# Patient Record
(demographics unavailable — no encounter records)

---

## 2025-01-10 NOTE — REVIEW OF SYSTEMS
[Chest Discomfort] : chest discomfort [Joint Pain] : joint pain [Joint Stiffness] : joint stiffness [Rash] : rash [Negative] : Heme/Lymph

## 2025-01-10 NOTE — REASON FOR VISIT
[Structural Heart and Valve Disease] : structural heart and valve disease [Symptom and Test Evaluation] : symptom and test evaluation

## 2025-01-16 NOTE — HISTORY OF PRESENT ILLNESS
[FreeTextEntry1] : TARA FLORES  is a 70 year old  F There is a longstanding history of hypertension. At one point she had lost weight and subsequently had an episode of syncope related to her diuretic. There is a history of hyperlipidemia. She previously had an intolerance to statin.  awoke on Monday 4/29/24 awoke with extreme L arm pain and jaw tightness, called EMS and in ambulance developed chest pain. She ruled in for MI, elevated trops and EKG changes. Went for LHC showing mild to mod nonobx CAD. Echo showed EF 50% and RWMAs. There is a concern for SCAD of RCA. Plan was outpatient followup for eval of FMD and med rx.  Return to the hospital 5/3/24 with chest pain. Admitted for monitoring. Persistent elevation of troponin. Evolution of EKG changes. Repeat echo with stable left ventricular function. Addition of colchicine. Inpatient blood work with hemoglobin 13 creatinine 0.6 discharge medic signs include amlodipine 5 mg, aspirin 81, atorvastatin 40 mg, clopidogrel 75 mg, colchicine, lisinopril 5 mg, Toprol 50 mg.  Seen in followup 5/7/24 lisinopril stopped and reduced amlodipine d/t low BPs. Seen again 5/9/24 there was a concern for side effects to colchicine so it was stopped Then returned to [facility] 5/12/24 with recurrent CP radiating to her jaw. Troponin 32. . Echo showed pericardial thickening. 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 on colchicine TID and ibuprofen TID. She feels an improvement in symptoms. She has concerns about the plaque in her heart. She has muscle cramps on atorva 40mg. Previously intolerant rosuvastatin. There is excessive bruising on DAPT to BLUE and BLLE.  Last seen June 2024  in the interim she has followed with Dr. AMRROQUIN referred to rheumatology also inflammatory markers  she was intolerant of colchicine  she also did not take the recommended monoclonal antibody  cardiac MRI June 2024 there was report of interval progression and subacute myocarditis and LV function  blood work June 2024  ESR normal lipid profile with total cholesterol 205  C-reactive protein normal potassium 4.3 creatinine 0.7  she was sent by Dr. MARROQUIN to [place] for consideration of heart biopsy and possible immunotherapy  she was admitted June 29 to July 2 third 2024 underwent RV biopsy there was not recommendation of steroids or further immunosuppression at this time.  The biopsy reportedly showed no inflammation or infiltrative process  she was started on guideline directed medical therapy. Metoprolol and losartan.  She is also on Zetia for nonstatin lipid-lowering therapy  there is also discussion of a cardiac PET scan  last echocardiogram has normal left ventricular function  she was last seen by heart failure specialist in July  she presently is taking metoprolol and Zetia she is not taking the previously recommended losartan Repatha   outside EKG has sinus bradycardia with nonspecific ST changes  recently she was seen in the hospital for chest pain and dyspnea  inpatient blood work hemoglobin 13.1 troponin negative CRP normal  TSH 1.9 lactate 0.8  at that time she was discharged from the emergency room  She reports having an episode of chest discomfort. Fullness in her heart.  She continues to have symptoms of joint pain and dry eyes and reported worsening osteoporosis.  She has been seeing a dermatologist regarding rash.  She is scheduled to see rheumatology.   Today's EKG demonstrates sinus rhythm minor nonspecific ST changes. No significant change from prior.   CMR 5/20/24 suspect of subacute myocarditis. Preserved LV/RV systolic function and structure. Abnormal parametric values of LGE corresponding to [place] criteria. Monitor 3/2024 normal sinus rhythm avg 87 low burden ectopy zero triggers August 2023 hemoglobin 14.6 total cholesterol 219  creatinine 0.7 Lab 3/2024 , , k 4.2, creat 0.67, AST 57, ALT 52, CRP wnl, hgb 13.9, ESR wnl, a1c 5.4, Lp(a) wnl, JOVANI negative, abnl RBC morphology Nuclear 3/2024 5 min exercise, small mild defect mid anterior and inferolateral walls fixed s/o attenuation Carotid doppler 3/2024 mild nonobx atherosclerosis

## 2025-01-16 NOTE — ASSESSMENT
[FreeTextEntry1] :  Blood work requested. Echocardiogram. Stress test. Unable to walk on treadmill. Walking Lexiscan study.  A copy of her MRI report has been provided to the patient to bring to her rheumatologist Follow-up with heart failure, rheumatology, dermatology as scheduled.   myocarditis f/u with HF specialist.  CAD mod nonobx mi ? scad abnl ekg lipid lowering intol statins x2 (LFTs, myalgias)  HTN off norvasc for low BP cont toprol 25mg QHS low salt diet reg exercise and weight loss reviewed  Reviewed red flag symptoms which would warrant emergent medical evaluation. Any questions and concerns were addressed and resolved.

## 2025-01-16 NOTE — HISTORY OF PRESENT ILLNESS
[FreeTextEntry1] : TARA FLORES  is a 70 year old  F There is a longstanding history of hypertension. At one point she had lost weight and subsequently had an episode of syncope related to her diuretic. There is a history of hyperlipidemia. She previously had an intolerance to statin.  awoke on Monday 4/29/24 awoke with extreme L arm pain and jaw tightness, called EMS and in ambulance developed chest pain. She ruled in for MI, elevated trops and EKG changes. Went for LHC showing mild to mod nonobx CAD. Echo showed EF 50% and RWMAs. There is a concern for SCAD of RCA. Plan was outpatient followup for eval of FMD and med rx.  Return to the hospital 5/3/24 with chest pain. Admitted for monitoring. Persistent elevation of troponin. Evolution of EKG changes. Repeat echo with stable left ventricular function. Addition of colchicine. Inpatient blood work with hemoglobin 13 creatinine 0.6 discharge medic signs include amlodipine 5 mg, aspirin 81, atorvastatin 40 mg, clopidogrel 75 mg, colchicine, lisinopril 5 mg, Toprol 50 mg.  Seen in followup 5/7/24 lisinopril stopped and reduced amlodipine d/t low BPs. Seen again 5/9/24 there was a concern for side effects to colchicine so it was stopped Then [place] [facility] [place]12[place]24 with recurrent CP radiating to her jaw. Troponin 32. . Echo showed pericardial thickening. 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 on colchicine TID and ibuprofen TID. She feels an improvement in symptoms. She has concerns about the plaque in her heart. She has muscle cramps on atorva 40mg. Previously intolerant rosuvastatin. There is excessive bruising on DAPT to BLUE and BLLE.  Last seen June 2024  in the interim she has followed with Dr. MARROQUIN referred to rheumatology also inflammatory markers  she was intolerant of colchicine  she also did not take the recommended monoclonal antibody  cardiac MRI June 2024 there was report of interval progression and subacute myocarditis and LV function  blood work June 2024  ESR normal lipid profile with total cholesterol 205  C-reactive protein normal potassium 4.3 creatinine 0.7  she was sent by Dr. MARROQUIN to [place] for consideration of heart biopsy and possible immunotherapy  she was admitted June 29 to July 2 third 2024 underwent RV biopsy there was not recommendation of steroids or further immunosuppression at this time.  The biopsy reportedly showed no inflammation or infiltrative process  she was started on guideline directed medical therapy. Metoprolol and losartan.  She is also on Zetia for nonstatin lipid-lowering therapy  there is also discussion of a cardiac PET scan  last echocardiogram has normal left ventricular function  she was last seen by heart failure specialist in July  she presently is taking metoprolol and Zetia she is not taking the previously recommended losartan Repatha   outside EKG has sinus bradycardia with nonspecific ST changes  recently she was seen in the hospital for chest pain and dyspnea  inpatient blood work hemoglobin 13.1 troponin negative CRP normal  TSH 1.9 lactate 0.8  at that time she was discharged from the emergency room  She reports having an episode of chest discomfort. Fullness in her heart.  She continues to have symptoms of joint pain and dry eyes and reported worsening osteoporosis.  She has been seeing a dermatologist regarding rash.  She is scheduled to see rheumatology.   Today's EKG demonstrates sinus rhythm minor nonspecific ST changes. No significant change from prior.   CMR 5/20/24 suspect of subacute myocarditis. Preserved LV/RV systolic function and structure. Abnormal parametric values of LGE corresponding to [place] criteria. Monitor 3/2024 normal sinus rhythm avg 87 low burden ectopy zero triggers August 2023 hemoglobin 14.6 total cholesterol 219  creatinine 0.7 Lab 3/2024 , , k 4.2, creat 0.67, AST 57, ALT 52, CRP wnl, hgb 13.9, ESR wnl, a1c 5.4, Lp(a) wnl, JOVANI negative, abnl RBC morphology Nuclear 3/2024 5 min exercise, small mild defect mid anterior and inferolateral walls fixed s/o attenuation Carotid doppler 3/2024 mild nonobx atherosclerosis

## 2025-01-16 NOTE — HISTORY OF PRESENT ILLNESS
[FreeTextEntry1] : TARA FLORES  is a 70 year old  F There is a longstanding history of hypertension. At one point she had lost weight and subsequently had an episode of syncope related to her diuretic. There is a history of hyperlipidemia. She previously had an intolerance to statin.  awoke on Monday 4/29/24 awoke with extreme L arm pain and jaw tightness, called EMS and in ambulance developed chest pain. She ruled in for MI, elevated trops and EKG changes. Went for LHC showing mild to mod nonobx CAD. Echo showed EF 50% and RWMAs. There is a concern for SCAD of RCA. Plan was outpatient followup for eval of FMD and med rx.  Return to the hospital 5/3/24 with chest pain. Admitted for monitoring. Persistent elevation of troponin. Evolution of EKG changes. Repeat echo with stable left ventricular function. Addition of colchicine. Inpatient blood work with hemoglobin 13 creatinine 0.6 discharge medic signs include amlodipine 5 mg, aspirin 81, atorvastatin 40 mg, clopidogrel 75 mg, colchicine, lisinopril 5 mg, Toprol 50 mg.  Seen in followup 5/7/24 lisinopril stopped and reduced amlodipine d/t low BPs. Seen again 5/9/24 there was a concern for side effects to colchicine so it was stopped Then [place] [facility] [place]24 with recurrent CP radiating to her jaw. Troponin 32. . Echo showed pericardial thickening. 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 on colchicine TID and ibuprofen TID. She feels an improvement in symptoms. She has concerns about the plaque in her heart. She has muscle cramps on atorva 40mg. Previously intolerant rosuvastatin. There is excessive bruising on DAPT to BLUE and BLLE.  Last seen June 2024  in the interim she has followed with Dr. MARROQUIN referred to rheumatology also inflammatory markers  she was intolerant of colchicine  she also did not take the recommended monoclonal antibody  cardiac MRI June 2024 there was report of interval progression and subacute myocarditis and LV function  blood work June 2024  ESR normal lipid profile with total cholesterol 205  C-reactive protein normal potassium 4.3 creatinine 0.7  she was sent by Dr. MARROQUIN to [place] for consideration of heart biopsy and possible immunotherapy  she was admitted June 29 to July 2 third 2024 underwent RV biopsy there was not recommendation of steroids or further immunosuppression at this time.  The biopsy reportedly showed no inflammation or infiltrative process  she was started on guideline directed medical therapy. Metoprolol and losartan.  She is also on Zetia for nonstatin lipid-lowering therapy  there is also discussion of a cardiac PET scan  last echocardiogram has normal left ventricular function  she was last seen by heart failure specialist in July  she presently is taking metoprolol and Zetia she is not taking the previously recommended losartan Repatha   outside EKG has sinus bradycardia with nonspecific ST changes  recently she was seen in the hospital for chest pain and dyspnea  inpatient blood work hemoglobin 13.1 troponin negative CRP normal  TSH 1.9 lactate 0.8  at that time she was discharged from the emergency room  She reports having an episode of chest discomfort. Fullness in her heart.  She continues to have symptoms of joint pain and dry eyes and reported worsening osteoporosis.  She has been seeing a dermatologist regarding rash.  She is scheduled to see rheumatology.   Today's EKG demonstrates sinus rhythm minor nonspecific ST changes. No significant change from prior.   CMR 5/20/24 suspect of subacute myocarditis. Preserved LV/RV systolic function and structure. Abnormal parametric values of LGE corresponding to [place] criteria. Monitor 3/2024 normal sinus rhythm avg 87 low burden ectopy zero triggers August 2023 hemoglobin 14.6 total cholesterol 219  creatinine 0.7 Lab 3/2024 , , k 4.2, creat 0.67, AST 57, ALT 52, CRP wnl, hgb 13.9, ESR wnl, a1c 5.4, Lp(a) wnl, JOVANI negative, abnl RBC morphology Nuclear 3/2024 5 min exercise, small mild defect mid anterior and inferolateral walls fixed s/o attenuation Carotid doppler 3/2024 mild nonobx atherosclerosis

## 2025-01-24 NOTE — ASSESSMENT
[FreeTextEntry1] : TARA FLORES is a 70 year old F who presents today Jan 24, 2025 with the above history and the following active issues:   Abnormal nuclear stress test.  The SPECT perfusion defect is significantly large compared to previous nuclear SPECT; AND in approximately in the same area as LGE on cMRI.  Chest pain, atypical  Above testing has been reviewed with the patient.  Discussed risk of major adverse cardiovascular events without further evaluation and management.  Recommend cardiac catheterization.  It may also be worthwhile pursuing another cMRI if cath cannot explain significant worsening of SPECT scan. Discussed the risks, benefits, alternatives of invasive angiography. Discussed potential for revascularization, percutaneous v surgical All questions answered. If escalating sx or sx at rest then 911 Daily aspirin therapy.  Beta blocker therapy.   A copy of her MRI report has been provided to the patient to bring to her rheumatologist Follow-up with heart failure, rheumatology, dermatology as scheduled.   myocarditis f/u with HF specialist.  CAD mod nonobx mi ? scad abnl ekg lipid lowering intol statins x2 (LFTs, myalgias) declines injectable lipid lowering cont zetia, add nexletol  denies gout hx  HTN off norvasc for low BP cont toprol 25mg QHS low salt diet reg exercise and weight loss reviewed  Sincerely,   JASPREET Chávez Patients history, testing, medications, and any relative changes to plan of care reviewed with supervising MD: Dr. Paul Blair

## 2025-01-24 NOTE — HISTORY OF PRESENT ILLNESS
[FreeTextEntry1] : TARA FLORES  is a 70 year old  F There is a longstanding history of hypertension. At one point she had lost weight and subsequently had an episode of syncope related to her diuretic. There is a history of hyperlipidemia. She previously had an intolerance to statin.  awoke on Monday 4/29/24 awoke with extreme L arm pain and jaw tightness, called EMS and in ambulance developed chest pain. She ruled in for MI, elevated trops and EKG changes. Went for LHC showing mild to mod nonobx CAD. Echo showed EF 50% and RWMAs. There is a concern for SCAD of RCA. Plan was outpatient followup for eval of FMD and med rx.  Return to the hospital 5/3/24 with chest pain. Admitted for monitoring. Persistent elevation of troponin. Evolution of EKG changes. Repeat echo with stable left ventricular function. Addition of colchicine. Inpatient blood work with hemoglobin 13 creatinine 0.6 discharge medic signs include amlodipine 5 mg, aspirin 81, atorvastatin 40 mg, clopidogrel 75 mg, colchicine, lisinopril 5 mg, Toprol 50 mg.  Seen in followup 5/7/24 lisinopril stopped and reduced amlodipine d/t low BPs. Seen again 5/9/24 there was a concern for side effects to colchicine so it was stopped Then [place] [facility] [place] with recurrent CP radiating to her jaw. Troponin 32. . Echo showed pericardial thickening. 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 on colchicine TID and ibuprofen TID. She feels an improvement in symptoms. She has concerns about the plaque in her heart. She has muscle cramps on atorva 40mg. Previously intolerant rosuvastatin. There is excessive bruising on DAPT to BLUE and BLLE.  Last seen two week ago Dr. Her Followed with Dr. MARROQUIN referred to rheumatology also inflammatory markers  she was intolerant of colchicine  she also did not take the recommended monoclonal antibody  cardiac MRI June 2024 there was report of interval progression and subacute myocarditis and LV function  blood work June 2024  ESR normal lipid profile with total cholesterol 205  C-reactive protein normal potassium 4.3 creatinine 0.7  she was sent by Dr. MARROQUIN to [place] for consideration of heart biopsy and possible immunotherapy  she was admitted June 29 to July 2 third 2024 underwent RV biopsy there was not recommendation of steroids or further immunosuppression at this time.  The biopsy reportedly showed no inflammation or infiltrative process  she was started on guideline directed medical therapy. Metoprolol and losartan.  She is also on Zetia for nonstatin lipid-lowering therapy  there is also discussion of a cardiac PET scan  last echocardiogram has normal left ventricular function  she was last seen by heart failure specialist in July  she presently is taking metoprolol and Zetia she is not taking the previously recommended losartan Repatha   outside EKG has sinus bradycardia with nonspecific ST changes  recently she was seen in the hospital for chest pain and dyspnea  inpatient blood work hemoglobin 13.1 troponin negative CRP normal  TSH 1.9 lactate 0.8  at that time she was discharged from the emergency room  She reports having an episode of chest discomfort. Fullness in her heart.  She continues to have symptoms of joint pain and dry eyes and reported worsening osteoporosis.  She has been seeing a dermatologist regarding rash.  She is scheduled to see rheumatology.   Lab 1/2025 hg 14,  (prior 578), tsh wnl, , k 5, cr 0.65, ck/crp/esr/rose wnl, a1c 5.3, Lp(a) wnl  Echo 1/2025 EF 55-60% mild LVH, GLS -18, small RV cavity increase wall thickness normal function, trace VHD Nuclear stress test 1/2025 exercise 6 min 39 sec, no symptom, no sig EKG changes beyond baseline. Large moderate to severe fixed defect lateral inferolateral and apical anteroseptal walls s/o infarct.  EKG demonstrates sinus rhythm minor nonspecific ST changes. No significant change from prior.   CMR 5/20/24 suspect of subacute myocarditis. Preserved LV/RV systolic function and structure. Abnormal parametric values of LGE corresponding to [place] criteria. Monitor 3/2024 normal sinus rhythm avg 87 low burden ectopy zero triggers August 2023 hemoglobin 14.6 total cholesterol 219  creatinine 0.7 Lab 3/2024 , , k 4.2, creat 0.67, AST 57, ALT 52, CRP wnl, hgb 13.9, ESR wnl, a1c 5.4, Lp(a) wnl, ROSE negative, abnl RBC morphology Nuclear 3/2024 5 min exercise, small mild defect mid anterior and inferolateral walls fixed s/o attenuation Carotid doppler 3/2024 mild nonobx atherosclerosis

## 2025-01-30 NOTE — HISTORY OF PRESENT ILLNESS
[FreeTextEntry1] : 71 yo F pericarditis with subacute myocarditis, nonobstructive CAD, HTN, HLD, presents today for follow-up of her cardiomyopathy.  Patient describes herself as having longstanding hypertension for> 10 years.  She was evaluated the beginning of March by her primary cardiologist for heart palpitations.  She had a Holter monitor, echo and nuclear test performed at that time and was started on low-dose metoprolol.  She had no evidence of any ischemia or pericardial effusion.  The day after her follow-up visit she presented to [facility] on 4/29/2024 with jaw pain found to have STEMI.  Echo with LVEF 50% with some inferior WMA. Patient had elevated troponins and underwent LHC showing mild to moderate nonobstructive CAD.  She was discovered with consideration for possible SCAD/FMD/ myocarditis.  Patient continued to have recurrent chest pain and was readmitted on 5/3/2024 and 5/12/24.  Troponin once again was waxing and waning.  Colchicine was added for possible pericarditis.  She had intermittent intolerance to colchicine.  She had 2 other subsequent ED visits with similar chest pain radiating to her jaw.  ESR at this time was 6.  Outpatient MRI was performed on 5/20/24 which showed LVEF 53% with base mid subepicardial enhancement in the lateral and inferolateral walls -suspicious for subacute myocarditis.    First met her on 6/21/2024.  At that time she colchicine earlier due to feeling poorly.  She has been on some form of colchicine since 5/3/2024 - 6/14/24 (~ 1 month).  She ongoing chest discomfort.  I had offer her a myocardial biopsy as a diagnostic tool however patient deferred.  Instead, we proceeded with repeat cardiac MRI which was done on 6/27/2024 which reported interval progression of subacute myocarditis with decrease in LV function from 53 to 47%.  She was admitted to [facility] for cardiac biopsy and expedited evaluation.  Luminary biopsy results show no inflammation or infiltrative process.  No vasculitis or gamulomas. There was an increase lipofuscin pigmented myocardial cells are present. Laboratory markers were low with ESR < 2 and CRP <3.  No steroids or immunomodulators were initiated.  She has not seen me since July 2024.    Since then she presented to her primary cardiologist in January 2025 with chest discomfort. Nuclear SPECT on 1/16/2024 was done and showed a large sized moderate to severe fixed defect in the lateral inferolateral and apical septal walls.  Echocardiogram on 1/10/2025 showed normal LVEF of 55-60% with no effusion.  Patient presented to [facility] with chest pain and was admitted from 1/25-1/27/25.  Troponin fluctuated between 8-10.  CK 76, CRP <0.4, ESR 4.   EKG without ischemic changes.  She underwent left heart catheterization which showed only 20% LAD stenosis.  She notes her metoprolol was increased to 50 mg twice daily.  She self took discontinued prior losartan in July.  Here for posthospital follow-up.  She reports ongoing chest pain and joint pain.  She is following with rheumatologist in [place]. She denies dyspnea at rest or exertion, orthopnea, leg swelling, changes in appetite, changes in weight, bendopnea, lightheadedness, dizziness, and syncope/pre-syncope.  She does endorse dry eye and occasional dysphagia with GERD and hiatal hernia. She does not have a diagnosis of arthritis or any autoimmune conditions that her rheumatologist was able to diagnose.  However, she was on prednisone before [place].  She now has osteoporosis.

## 2025-01-30 NOTE — DISCUSSION/SUMMARY
[FreeTextEntry1] : # Chest Pain with prior myocarditis and non-obstructive CAD  - ETIOLOGY:  - unclear.  - no pericardial effusion on echo.  EKG reviewed from 1/10/25 without and ST changes to suggest pericarditis. -LHC 1/27/25: 20% LAD stenosis. - Prior endomyocardial heart biopsy (7/2024) without active inflammation, vasculitis or granulomas.  - Undergoing rheumatology workup:  Work up so far negative: ESR, CRP, JOVANI, dsDNA, C3, C4, JARRELL, SSA/SSB, vitamin D 1,25, ACE, MPO, PR3.  Mildly positive RF to 19, CCP pending.  Positive Coxsackie antibodies. - PET (non-cardiac) 7/15/24: FDG uptake seen on lateral wall of heart only. This did not match the entire areas of LGE uptake on MRI. -With recent worsening symptoms, will obtain repeat cMRI.  -Will also obtain rest stress cardiac PET to rule out microvascular ischemia.  We did discuss possible LHC with ANOCA testing if concerns still ongoing.  - TREATMENT:  -No plan for current empiric treatment with accelerated steroid/anti-inflammatory therapy.  Troponin, ESR and CRP negative at this time. -Patient intolerant of colchicine.  She was only able to complete 1.5 months of therapy. -Of note she now has osteoporosis and will need to consider nonsteroidal therapies in the future if needed.  # HF rec EF  - previously was low at ~47%.  Last 55-60%.   - Continue on metoprolol 50 mg XL daily. - RESUME [id number] daily in PM. This will also help with chest pain if she has microvascular ischemia. - Euvolemic on exam.  Does not need any diuretics at this time.  #Hyperlipidemia -Patient has intolerance to statins (muscle cramps with atorvastatin 40 mg daily).  She was offered Repatha however she notes that this is too expensive. -She is on Zetia but notes that she still has muscle pain.  She wants to try bempedoic acid.  Prescription given. -Lipid panel 1/26/2025: , HDL 53, LDL 83, TG 59.  Follow-up after cMRI and PET scan.

## 2025-01-30 NOTE — PHYSICAL EXAM
[Normal] : soft, non-tender, no masses/organomegaly, normal bowel sounds [Normal Gait] : normal gait [No Edema] : no edema [No Rash] : no rash [Moves all extremities] : moves all extremities [Alert and Oriented] : alert and oriented [de-identified] : no pain on palpation over chest [de-identified] : warm

## 2025-01-30 NOTE — CARDIOLOGY SUMMARY
[de-identified] : Nuclear SPECT 1/16/2024: 6: 39 min, 8 METS, MPHR 88%, no chest pain or symptoms during exercise.  Had a large sized moderate to severe fixed defect in the lateral inferolateral and apical septal walls. 4/29/2024: LVEF 50%, LVEDD 5 cm, IVSd 0.8 cm, hypokinetic inferolateral walls, normal RV function, mild LAE, mild to mod MR, no pericardial effusion. [de-identified] : 1/14/2025: LVEF 55 to 60%, LVEDD 4.4 cm, IVSd 1.2 cm, GLS -18%, normal RV TAPSE 2.7 cm, trace MR, trace TR, PASP 9 mmHg, normal IVC. [de-identified] : [facility] 6/27/24: LVEF 47%, LVEDV 146mL, RVEF 62%, RVEDV 84 mL, increase in subepicardial, mid wall enhancement of the basal anterolateral, lateral and apical lateral segments. [facility] 5/20/24: LVEF 53%, LVEDD 123 mL, RVEF 67%, RV EDV 93 mL, base mid subepicardial enhancement in the lateral and inferolateral walls -suspect subacute myocarditis. [de-identified] : PET scan 7/15/24 (not a cardiac dedicated PET scan):  LV wall focal uptake with SUV 3.3. [de-identified] : [facility] 1/27/25: Nonobstructive CAD, LAD 20%. [facility] 4/29/2024: Nonobstructive CAD.  BLESSING-3 flow all vessels.  LVEDP 23. [de-identified] : Endomyocardial biopsy 7/2/2024:  Myocardium with increased lipofuscin pigment.  No inflammation, granulomas or vasculitis is present.

## 2025-01-30 NOTE — ASSESSMENT
[FreeTextEntry1] : 69 yo F pericarditis with subacute myocarditis, nonobstructive CAD, HTN, HLD, presents today for follow-up of her cardiomyopathy.

## 2025-02-03 NOTE — REASON FOR VISIT
[Symptom and Test Evaluation] : symptom and test evaluation [FreeTextEntry1] : I saw this  70 year old  F in. f/u on 02/03/25   There is a longstanding history of hypertension. At one point she had lost weight and subsequently had an episode of syncope related to her diuretic. There is a history of hyperlipidemia. She previously had an intolerance to statin.  Initial visit with heart racing. She reports her heart rate is elevated and she feels awareness of her chest. She has trouble catching her breath. There is associated lightheadedness. There are chronic musculoskeletal pains. She had a monitor and developed a rash. Walks. Eating healthy meals direct.  awoke on Monday 4/29/24 awoke with extreme L arm pain and jaw tightness, called EMS and in ambulance developed chest pain. She ruled in for MI, elevated trops and EKG changes. Went for [facility] showing mild to mod nonobx CAD. Echo showed EF 50% and RWMAs. There is a concern for SCAD of RCA. Plan was outpatient followup for eval of FMD and med rx.  [place] [facility] [place] elevated troponin, residual and evolving EKG  observed had echo with low nl ef and inferior wma  she is hoping to travel to travel to [facility] over the summer  Her home blood pressure has been low. I have stopped her lisinopril and reduced her dose of amlodipine. She prefers not to attend [facility] for cardiac rehab due to prior traumatic experience.  Monitor 3/2024 normal sinus rhythm avg 87 low burden ectopy zero triggers August 2023 hemoglobin 14.6 total cholesterol 219  creatinine 0.7 Lab 3/2024 , , k 4.2, creat 0.67, AST 57, ALT 52, CRP wnl, hgb 13.9, ESR wnl, a1c 5.4, Lp(a) wnl, JOVANI negative, abnl RBC morphology Nuclear 3/2024 5 min exercise, small mild defect mid anterior and inferolateral walls fixed s/o attenuation Carotid doppler 3/2024 mild nonobx atherosclerosis Echo 3/2024 EF 60% trace VHD She underwent a cardiac MRI which revealed a diagnosis of myocarditis localized to the inf. lateral wall. Last week she presented to the emergency room again with chest pain and underwent cardiac cath which again revealed normal coronary arteries.  The right radial cath site has mild ecchymosis and is tender but is not worrisome.

## 2025-02-03 NOTE — REVIEW OF SYSTEMS
[Dyspnea on exertion] : dyspnea during exertion [Joint Pain] : joint pain [Under Stress] : under stress [Negative] : Heme/Lymph

## 2025-02-03 NOTE — ASSESSMENT
[FreeTextEntry1] :   Recent hospital records have been reviewed.  Plan for a symptom limited submaximal exercise test then enrollment in cardiac rehab  medication list has been reconciled.  Monitor heart rate and blood pressure at cardiac rehab.  Close clinical follow-up.  We also discussed cardiac MRI in the future to reevaluate left ventricular function and scar burden  recent mi ? scad abnl ekg HTN now BP low low salt diet reg exercise and weight loss reviewed  HLD prior statin intolerance, now back on atorva mild carotid plaque diet changes reviewed  Rash. inflammatory markers negative on labs followup derm/PCP CTA c/a/p and coordinate with vascular re eval for FMD  Saw GI for abd pain reports less ETOH and LFTs improved, has upcoming endoscopy. Seen by vascular Plan for LLE phlebectomy and BLE sclerotherapy.

## 2025-02-03 NOTE — HISTORY OF PRESENT ILLNESS
[FreeTextEntry1] : She has recurrent  chest pain She has no shortness of breath She has no palpitations She has no syncope She is neurologically intact She has no edema She has no skin rashes

## 2025-02-03 NOTE — ADDENDUM
Chief complaint:   Chief Complaint   Patient presents with   • URI     Triaged       Vitals:  Visit Vitals  /82   Pulse 89   Temp 97.7 °F (36.5 °C) (Oral)   Resp 19   Ht 5' 3\" (1.6 m)   Wt 72.6 kg (160 lb)   LMP 04/15/2022 (Approximate)   SpO2 95%   BMI 28.34 kg/m²       HISTORY OF PRESENT ILLNESS     The patient is a 27-year-old female who presents to the urgent care with complaint of a 3 day history of nasal congestion, rhinorrhea, postnasal drip, a nonproductive cough, intermittent nausea, intermittent wheezing, chest tightness with coughing, shortness of breath with coughing/activity, itchy watery eyes, and fatigue.  The patient reports a history of seasonal allergies.  She reports taking an antihistamine and Flonase with some improvement.  She denies having any fevers, chills, chest pain, vomiting, diarrhea, or loss of taste or smell.  She denies any known COVID-19 exposures or history of having COVID-19 in the last 90 days.  She is vaccinated against COVID-19.  She denies a history of asthma, pneumonia, or cigarette smoking.  She denies any chance of pregnancy and is not breastfeeding.  She denies recent travel. She is also complaining of a faint itching rash on the inner aspects of her bilateral elbows that has been present for a few weeks. She denies noticing any drainage from the area. She reports a history of eczema. She denies using any new products or ingesting new foods/medications.       Other significant problems:  Patient Active Problem List    Diagnosis Date Noted   • Flexural eczema 05/15/2017     Priority: Low   • GSI (genuine stress incontinence), female 05/13/2015     Priority: Low   • Allergic rhinitis 05/16/2013     Priority: Low   • Implanon insertion-inserted 2- 11/30/2012     Priority: Low   • Headache(784.0) 10/02/2012     Priority: Low       PAST MEDICAL, FAMILY AND SOCIAL HISTORY     Medications:  Current Outpatient Medications   Medication   • etonogestrel  [FreeTextEntry1] : EKG obtained to assist in the diagnosis and management of assisted problem(s).  Return to the hospital with chest pain.  Admitted for monitoring.  Persistent elevation of troponin.  Evolution of EKG changes.  Repeat echo with stable left ventricular function.  Addition of colchicine.  Inpatient blood work with hemoglobin 13 creatinine 0.6 discharge medic signs include amlodipine 5 mg, aspirin 81, atorvastatin 40 mg, clopidogrel 75 mg, colchicine, lisinopril 5 mg, Toprol 50 mg (NEXPLANON/IMPLANON) 68 MG implant   • triamcinolone (ARISTOCORT) 0.1 % cream   • benzonatate (TESSALON PERLES) 100 MG capsule   • albuterol 108 (90 Base) MCG/ACT inhaler   • predniSONE (DELTASONE) 20 MG tablet   • fluconazole (Diflucan) 150 MG tablet   • albuterol 108 (90 Base) MCG/ACT inhaler   • fluticasone (Flonase) 50 MCG/ACT nasal spray     No current facility-administered medications for this visit.     Facility-Administered Medications Ordered in Other Visits   Medication   • fentaNYL (SUBLIMAZE) 2 mcg/mL - ropivacaine (NAROPIN PF) 0.1% premix epidural infusion       Allergies:  ALLERGIES:   Allergen Reactions   • Seasonal        Past Medical  History/Surgeries:  Past Medical History:   Diagnosis Date   • Seasonal allergies        Past Surgical History:   Procedure Laterality Date   • Tonsillectomy and adenoidectomy         Family History:  History reviewed. No pertinent family history.    Social History:  Social History     Tobacco Use   • Smoking status: Never Smoker   • Smokeless tobacco: Never Used   Substance Use Topics   • Alcohol use: Yes     Alcohol/week: 0.0 standard drinks       REVIEW OF SYSTEMS     Review of Systems   Constitutional: Positive for fatigue. Negative for chills and fever.   HENT: Positive for congestion, postnasal drip, rhinorrhea and sneezing. Negative for ear discharge, ear pain, mouth sores, sinus pain, sore throat, trouble swallowing and voice change.    Eyes: Positive for discharge and itching. Negative for redness.   Respiratory: Positive for cough, chest tightness, shortness of breath and wheezing.    Cardiovascular: Negative for chest pain, palpitations and leg swelling.   Gastrointestinal: Negative for abdominal pain, constipation, diarrhea, nausea and vomiting.   Musculoskeletal: Negative for myalgias, neck pain and neck stiffness.   Skin: Negative for rash.   Neurological: Positive for headaches. Negative for dizziness, syncope and light-headedness.       PHYSICAL EXAM      Physical Exam  Vitals reviewed.   Constitutional:       General: She is not in acute distress.     Appearance: She is well-developed. She is not toxic-appearing or diaphoretic.   HENT:      Right Ear: Tympanic membrane, ear canal and external ear normal.      Left Ear: Tympanic membrane, ear canal and external ear normal.      Nose: Rhinorrhea present.      Right Sinus: No maxillary sinus tenderness or frontal sinus tenderness.      Left Sinus: No maxillary sinus tenderness or frontal sinus tenderness.      Mouth/Throat:      Lips: Pink.      Mouth: Mucous membranes are moist.      Pharynx: Oropharynx is clear. Uvula midline. No pharyngeal swelling, oropharyngeal exudate, posterior oropharyngeal erythema or uvula swelling.      Tonsils: No tonsillar exudate or tonsillar abscesses.      Neck: Normal range of motion and neck supple.   Eyes:      General:         Right eye: No foreign body or discharge.         Left eye: No foreign body or discharge.      Conjunctiva/sclera:      Right eye: Right conjunctiva is not injected.      Left eye: Left conjunctiva is not injected.   Cardiovascular:      Rate and Rhythm: Normal rate and regular rhythm.      Pulses: Normal pulses.      Heart sounds: Normal heart sounds, S1 normal and S2 normal. No murmur heard.    No friction rub. No gallop.   Pulmonary:      Effort: Pulmonary effort is normal. No respiratory distress.      Breath sounds: No stridor. Examination of the right-upper field reveals wheezing. Examination of the left-upper field reveals wheezing. Wheezing (expiratory wheezes cleared with coughing) present. No decreased breath sounds, rhonchi or rales.   Lymphadenopathy:      Cervical: No cervical adenopathy.   Skin:     General: Skin is warm and dry.      Capillary Refill: Capillary refill takes less than 2 seconds.      Findings: No rash.   Neurological:      Mental Status: She is alert and oriented to person, place, and time.   Psychiatric:         Speech:  Speech normal.         ASSESSMENT/PLAN     The patient is afebrile, nontoxic-appearing, and her vitals are stable.  She is not in distress and conversing in full sentences.  She has faint expiratory wheezes in the upper lobes bilaterally which clear with coughing. She is conversing in full sentences and not in any respiratory distress.  Her mucous membranes appear moist.  COVID-19 testing obtained.    COVID-19 test is negative.  I updated the patient on her results.  She is resting comfortably on repeat exam.  Her symptoms and exam findings are consistent with a viral upper respiratory infection.  I will prescribe a short course of prednisone for her wheezing, Tessalon Perles for her cough, OTC antihistamine eyedrops as needed, and albuterol inhaler to use as needed every 4 hours for shortness of breath/wheezing/chest tightness.  I recommended she take Claritin or Zyrtec daily, rest, push fluids, use cough drops, and take vitamin-C, vitamin D3, and zinc for immune support. I will also refill her triamcinolone cream for her flexural eczema.  She has no signs of a secondary bacterial or fungal skin infection.  I discussed the typical duration of viral symptoms and return precautions.    Nakita was seen today for uri.    Diagnoses and all orders for this visit:    Suspected COVID-19 virus infection  -     POCT SARS-COV-2 ANTIGEN    Viral URI with cough    Flexural eczema    Other orders  -     triamcinolone (ARISTOCORT) 0.1 % cream; Apply topically 3 times daily as needed (rash).  -     benzonatate (TESSALON PERLES) 100 MG capsule; Take 1 capsule by mouth 3 times daily as needed for Cough.  -     albuterol 108 (90 Base) MCG/ACT inhaler; Inhale 2 puffs into the lungs every 4 hours as needed for Shortness of Breath or Wheezing.  -     predniSONE (DELTASONE) 20 MG tablet; Take 2 tablets by mouth daily for 4 days.      The patient was wearing a mask and the writer wore personal protective equipment in accordance with CDC  guidelines during the encounter.

## 2025-02-03 NOTE — DISCUSSION/SUMMARY
[___ Month(s)] : in [unfilled] month(s) [FreeTextEntry1] : 1) there is mild ecchymosis at the right radial site with tenderness but no concerning issues 2) her EKG at the hospital was unchanged 3) I told her to remain on her medication to begin slow walking routine to try and return to normal activity. We discussed at length the findings of the MRI which suggested subacute myocarditis.

## 2025-02-24 NOTE — HISTORY OF PRESENT ILLNESS
[FreeTextEntry1] : 69 yo F pericarditis with subacute myocarditis, nonobstructive CAD, HTN, HLD, presents today for follow-up of her cardiomyopathy.  Patient describes herself as having longstanding hypertension for> 10 years.  She was evaluated the beginning of March by her primary cardiologist for heart palpitations.  She had a Holter monitor, echo and nuclear test performed at that time and was started on low-dose metoprolol.  She had no evidence of any ischemia or pericardial effusion.  The day after her follow-up visit she presented to [facility] [place]24 with jaw pain found to have STEMI.  Echo with LVEF 50% with some inferior WMA. Patient had elevated troponins and underwent LHC showing mild to moderate nonobstructive CAD.  She was discovered with consideration for possible SCAD/FMD/ myocarditis.  Patient continued to have recurrent chest pain and was readmitted on 5/3/2024 and 5/12/24.  Troponin once again was waxing and waning.  Colchicine was added for possible pericarditis.  She had intermittent intolerance to colchicine.  She had 2 other subsequent ED visits with similar chest pain radiating to her jaw.  ESR at this time was 6.  Outpatient MRI was performed on 5/20/24 which showed LVEF 53% with base mid subepicardial enhancement in the lateral and inferolateral walls -suspicious for subacute myocarditis.    First met her on 6/21/2024.  At that time she colchicine earlier due to feeling poorly.  She has been on some form of colchicine since 5/3/2024 - 6/14/24 (~ 1 month).  She ongoing chest discomfort.  I had offer her a myocardial biopsy as a diagnostic tool however patient deferred.  Instead, we proceeded with repeat cardiac MRI which was done on 6/27/2024 which reported interval progression of subacute myocarditis with decrease in LV function from 53 to 47%.  She was admitted to [facility] for cardiac biopsy and expedited evaluation.  Luminary biopsy results show no inflammation or infiltrative process.  No vasculitis or gamulomas. There was an increase lipofuscin pigmented myocardial cells are present. Laboratory markers were low with ESR < 2 and CRP <3.  No steroids or immunomodulators were initiated.  She has not seen me since July 2024.    Since then she presented to her primary cardiologist in January 2025 with chest discomfort. Nuclear SPECT on 1/16/2024 was done and showed a large sized moderate to severe fixed defect in the lateral inferolateral and apical septal walls.  Echocardiogram on 1/10/2025 showed normal LVEF of 55-60% with no effusion.  Patient presented to [facility] with chest pain and was admitted from 1/25-1/27/25.  Troponin fluctuated between 8-10.  CK 76, CRP <0.4, ESR 4.   EKG without ischemic changes.  She underwent left heart catheterization which showed only 20% LAD stenosis.  She notes her metoprolol was increased to 50 mg twice daily.  She self took discontinued prior losartan in July.  I last saw her on 1/30/2025 and had ordered cardiac PET plus MRI.  However, had a mechanical fall tripping over a tree branch with a number of injuries that precluded her from getting these test.  She is now rescheduled them to 3/7 and 3/11 respectively.  We also talked about needing to maximize her lipid therapy.  She had side effects with Zetia.  I had started her on a trial of Nexletol however, she notes that she looked up the potential side effects of this medication does not want to take it.  She has since done research on fish oil and is now taking 2 tablets daily of her own supplement.  Last visit I also started her back on losartan 12.5 mg daily in PM.  She notes feeling poorly with this (no dizziness) and is going to try to take this with dinner instead of prior to bed.  Since her fall, she has been doing cold plunged therapy and is looking to start swimming classes.  She denies dyspnea at rest or exertion, orthopnea, leg swelling, changes in appetite, changes in weight, bendopnea, lightheadedness, dizziness, and syncope/pre-syncope.  She does endorse dry eye and occasional dysphagia with GERD and hiatal hernia. She does not have a diagnosis of arthritis or any autoimmune conditions that her rheumatologist was able to diagnose.  However, she was on prednisone before [place].  She now has osteoporosis.  She is supposed to start on Prolia but is yet to do so as she is waiting for dental clearance.

## 2025-02-24 NOTE — CARDIOLOGY SUMMARY
[de-identified] : Nuclear SPECT 1/16/2024: 6: 39 min, 8 METS, MPHR 88%, no chest pain or symptoms during exercise.  Had a large sized moderate to severe fixed defect in the lateral inferolateral and apical septal walls. 4/29/2024: LVEF 50%, LVEDD 5 cm, IVSd 0.8 cm, hypokinetic inferolateral walls, normal RV function, mild LAE, mild to mod MR, no pericardial effusion. [de-identified] : 1/14/2025: LVEF 55 to 60%, LVEDD 4.4 cm, IVSd 1.2 cm, GLS -18%, normal RV TAPSE 2.7 cm, trace MR, trace TR, PASP 9 mmHg, normal IVC. [de-identified] : [facility] 6/27/24: LVEF 47%, LVEDV 146mL, RVEF 62%, RVEDV 84 mL, increase in subepicardial, mid wall enhancement of the basal anterolateral, lateral and apical lateral segments. [facility] 5/20/24: LVEF 53%, LVEDD 123 mL, RVEF 67%, RV EDV 93 mL, base mid subepicardial enhancement in the lateral and inferolateral walls -suspect subacute myocarditis. [de-identified] : PET scan 7/15/24 (not a cardiac dedicated PET scan):  LV wall focal uptake with SUV 3.3. [de-identified] : [facility] 1/27/25: Nonobstructive CAD, LAD 20%. [facility] 4/29/2024: Nonobstructive CAD.  BLESSING-3 flow all vessels.  LVEDP 23. [de-identified] : Endomyocardial biopsy 7/2/2024:  Myocardium with increased lipofuscin pigment.  No inflammation, granulomas or vasculitis is present.

## 2025-02-24 NOTE — PHYSICAL EXAM
[Normal] : soft, non-tender, no masses/organomegaly, normal bowel sounds [Normal Gait] : normal gait [No Edema] : no edema [No Rash] : no rash [Moves all extremities] : moves all extremities [Alert and Oriented] : alert and oriented [de-identified] : no pain on palpation over chest [de-identified] : warm

## 2025-02-24 NOTE — ASSESSMENT
[FreeTextEntry1] : 71 yo F pericarditis with subacute myocarditis, nonobstructive CAD, HTN, HLD, presents today for follow-up of her cardiomyopathy.

## 2025-02-24 NOTE — DISCUSSION/SUMMARY
[FreeTextEntry1] : # Chest Pain with prior myocarditis and non-obstructive CAD  - ETIOLOGY:  - unclear.  - no pericardial effusion on echo.  EKG reviewed from 1/10/25 without and ST changes to suggest pericarditis. -LHC 1/27/25: 20% LAD stenosis. - Prior endomyocardial heart biopsy (7/2024) without active inflammation, vasculitis or granulomas.  - Undergoing rheumatology workup:  Work up so far negative: ESR, CRP, JOVANI, dsDNA, C3, C4, JARRELL, SSA/SSB, vitamin D 1,25, ACE, MPO, PR3.  Mildly positive RF to 19, CCP pending.  Positive Coxsackie antibodies. - PET (non-cardiac) 7/15/24: FDG uptake seen on lateral wall of heart only. This did not match the entire areas of LGE uptake on MRI. -With recent worsening symptoms, will obtain repeat cMRI.  -Will also obtain rest stress cardiac PET to rule out microvascular ischemia.  We did discuss possible LHC with ANOCA testing if concerns still ongoing.  - TREATMENT:  -No plan for current empiric treatment with accelerated steroid/anti-inflammatory therapy.  Troponin, ESR and CRP negative at this time. -Patient intolerant of colchicine.  She was only able to complete 1.5 months of therapy. -Of note she now has osteoporosis and will need to consider nonsteroidal therapies in the future if needed.  # HF rec EF  - previously was low at ~47%.  Last 55-60%.   - Continue on metoprolol 50 mg XL daily. - RESUME qgtogebf82.5mg daily.  Tyr to take with dinner as opposed to PM to see if she feels better. This will also help with chest pain if she has microvascular ischemia.  She is agreeable. - Euvolemic on exam.  Does not need any diuretics at this time.  #Hyperlipidemia -Patient has intolerance to statins (muscle cramps with atorvastatin 40 mg daily) and Zetia (msulce pain).  She was previously offered Repatha however she notes that this is too expensive. - She does not want to start bempedoic acid due to potential tendon effects. -She has self-started on fish oil supplements.  WE discussed that this may not lower her LDL.  However, we will proceed with checking her cholesterol checked in 2 months. - May do well with a PSK9i (have not yet discussed) or RNA age - Leqvio. -Lipid panel 1/17/2025: TG 58, , HDL 59, . -6/29/2024: T TG 63, , HDL 60, .  Follow-up after cMRI and PET scan.

## 2025-03-13 NOTE — DISCUSSION/SUMMARY
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  Echo with LVEF 50% with some inferior WMA but no obstructive CAD on cath X2.  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  Now with residual scar in inferolateral territory based on numerous studies. Her recent CP episode in Jan 2025 may also have been from a spasm event. PRIOR STUDIES/TX: - MRI on 5/24/24 was concerning for possible subacute myocarditis (but no pericarditis).   - She completed 1.5 months of colchicine.  But had to stop due to diarrhea/GI intolerance. - Subsequent MRI on 6/27/2024 showed interval progression of myocarditis.  - Endomyocardial heart biopsy (7/2024) without active inflammation, vasculitis or granulomas.  -We had explored potential cardiac sarcoid.  However, patient had a PET scan (7/14/24) that was a full body scan, not a dedicated cardiac PET which showed focal increased uptake in the LV lateral wall. This did not match the entire areas of LGE uptake on MRI. Low suspicion for cardiac sarcoid at this time but can consider dedicated cardiac PET at a future date. - Rheumatology workup:  Work up so far negative: ESR, CRP, JOVANI, dsDNA, C3, C4, JARRELL, SSA/SSB, vitamin D 1,25, ACE, MPO, PR3.  Mildly positive RF to 19.  Positive Coxsackie antibodies. -[facility] 1/27/25: 20% LAD stenosis. - cMRI and rest/stress PET done in Feb/March 2025 show residual scar burden of 16.2% in the inferolateral wall with some hypokinesis.  No ischemia.  MFR > 2 in all territories with suggestive of no evidence of classic microvascular dysfunction.  However, stress blood flow did drop some but not enough to drop MFR.  May benefit from targeted microvascular therapy.  Will start on Imdur as below.  # HF reduced EF  - previously was low at ~48%. - Continue on metoprolol 50 mg XL daily. -Did not tolerate losartan at any dose due to headache. -We extensively discussed GDMT options today.  We will start with Imdur 30 mg XL daily to prevent spasm.  - We then discussed adding on Farxiga/Jardiance as well as spironolactone.  - Patient was given a sample of Jardiance 10 mg daily.  If she tolerates Imdur well, she will start on Jardiance 1 week from now.  Nursing team to follow-up with her next week. - Euvolemic on exam.  Does not need any diuretics at this time.  #Hyperlipidemia -Patient has intolerance to statins (muscle cramps with atorvastatin 40 mg daily) and Zetia (muscle pain).  - She was previously offered Repatha however she notes that this is too expensive. - She does not want to start bempedoic acid due to potential tendon effects. -She has self-started on fish oil supplements.   - May do well with a PSK9i (have not yet discussed) or RNA age - Leqvio. -1/17/2025: TG 58, , HDL 59, . -6/29/2024: T TG 63, , HDL 60, . - 1/17/25: , TG 58, HDL 59, .  Follow-up in 2 weeks.

## 2025-03-13 NOTE — ASSESSMENT
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

## 2025-03-13 NOTE — HISTORY OF PRESENT ILLNESS
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  Patient describes herself as having longstanding hypertension for> 10 years.  She was evaluated the beginning of March by her primary cardiologist for heart palpitations.  This occurred shortly after she had started a new weight loss program called [employer].  She had lost 11 pounds over the course of 3 weeks.  She was exercising for two 45-minute intervals per day.  She established care with Dr. Her.  Holter monitor, echo and nuclear test performed at that time and was started on low-dose metoprolol.  She had no evidence of any ischemia or pericardial effusion.  The day after her follow-up visit she presented to [facility] on 4/29/2024 with jaw pain found to have STEMI.  Echo with LVEF 50% with some inferior WMA. Patient had elevated troponins and underwent LHC showing mild to moderate nonobstructive CAD.  She was discovered with consideration for possible SCAD/FMD/ myocarditis.  Patient continued to have recurrent chest pain and was readmitted on 5/3/2024 and 5/12/24.  Troponin once again was waxing and waning.  Colchicine was added for possible pericarditis.  She had intermittent intolerance to colchicine.  She had 2 other subsequent ED visits with similar chest pain radiating to her jaw.  ESR at this time was 6.  Outpatient MRI was performed on 5/20/24 which showed LVEF 53% with base mid subepicardial enhancement in the lateral and inferolateral walls -suspicious for subacute myocarditis.    First met her on 6/21/2024.  At that time she colchicine earlier due to feeling poorly.  She has been on some form of colchicine since 5/3/2024 - 6/14/24 (~ 1 month).  She ongoing chest discomfort.  I had offer her a myocardial biopsy as a diagnostic tool however patient deferred.  Instead, we proceeded with repeat cardiac MRI which was done on 6/27/2024 which reported interval progression of subacute myocarditis with decrease in LV function from 53 to 47%.  She was admitted to [facility] for cardiac biopsy and expedited evaluation.  Luminary biopsy results show no inflammation or infiltrative process.  No vasculitis or gamulomas. There was an increase lipofuscin pigmented myocardial cells are present. Laboratory markers were low with ESR < 2 and CRP <3.  No steroids or immunomodulators were initiated.  She has not seen me since July 2024.  She represented to her primary cardiologist in January 2025 with chest discomfort. Nuclear SPECT on 1/16/2024 was done and showed a large sized moderate to severe fixed defect in the lateral inferolateral and apical septal walls.  Echocardiogram on 1/10/2025 showed normal LVEF of 55-60% with no effusion.  Patient presented to [facility] with chest pain and was admitted from 1/25-1/27/25.  Troponin fluctuated between 8-10.  CK 76, CRP <0.4, ESR 4.   EKG without ischemic changes.  She underwent left heart catheterization which showed only 20% LAD stenosis.  She notes her metoprolol was increased to 50 mg twice daily.  She self-discontinued prior losartan in July 2024 and re-trialed her on this in Jan 2025 but did not tolerate this due to headache and dizziness side effects.    I last saw her on 2/24/25.  She also suffered a mechanical fall tripping over a tree branch with a number of injuries that delay her cardiac PET and MRI.   She is here today to review the results.   Overall is feeling well.  She is doing aqua aerobics 3 times a week and cold plunged therapy.  She has increased from 30 seconds to 3 minutes per session.  She is also doing salt cave CBD treatments.  She only notes 1 episode of chest tightness since her last visit.  Otherwise, she denies dyspnea at rest or exertion, orthopnea, leg swelling, changes in appetite, changes in weight, bendopnea, lightheadedness, dizziness, and syncope/pre-syncope.  She does endorse dry eye and occasional dysphagia with GERD and hiatal hernia. She does not have a diagnosis of arthritis or any autoimmune conditions that her rheumatologist was able to diagnose.  However, she was on prednisone before Naseem.  She now has osteoporosis.  She is supposed to start on Prolia but is yet to do so as she is waiting for dental clearance.

## 2025-03-13 NOTE — PHYSICAL EXAM
[Normal] : soft, non-tender, no masses/organomegaly, normal bowel sounds [Normal Gait] : normal gait [No Edema] : no edema [No Rash] : no rash [Moves all extremities] : moves all extremities [Alert and Oriented] : alert and oriented [de-identified] : no pain on palpation over chest [de-identified] : warm

## 2025-04-02 NOTE — ASSESSMENT
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

## 2025-04-02 NOTE — CARDIOLOGY SUMMARY
[de-identified] : PET (rest and stress) 3/11/2025: Myocardial scar burden of 16.2%. No transient ischemic dilation. Myocardial flow reserve> 2 in all areas, moderate degree of non-transmural myocardial scarring in the apex, apical inferior, apical lateral and mid basal inferolateral walls.  LVEF 58%. Nuclear SPECT 1/16/2024: 6: 39 min, 8 METS, MPHR 88%, no chest pain or symptoms during exercise.  Had a large sized moderate to severe fixed defect in the lateral inferolateral and apical septal walls. 4/29/2024: LVEF 50%, LVEDD 5 cm, IVSd 0.8 cm, hypokinetic inferolateral walls, normal RV function, mild LAE, mild to mod MR, no pericardial effusion. [de-identified] : 1/14/2025: LVEF 55 to 60%, LVEDD 4.4 cm, IVSd 1.2 cm, GLS -18%, normal RV TAPSE 2.7 cm, trace MR, trace TR, PASP 9 mmHg, normal IVC. [de-identified] : [facility] 2/26/2025: LVEF 48%, LVEDV 142 mL, no resting perfusion defect, transmural LGE of the mid inferolateral wall and apical lateral wall. [facility] 6/27/24: LVEF 47%, LVEDV 146mL, RVEF 62%, RVEDV 84 mL, increase in subepicardial, mid wall enhancement of the basal anterolateral, lateral and apical lateral segments. [facility] 5/20/24: LVEF 53%, LVEDD 123 mL, RVEF 67%, RV EDV 93 mL, base mid subepicardial enhancement in the lateral and inferolateral walls -suspect subacute myocarditis. [de-identified] : PET scan 7/15/24 (not a cardiac dedicated PET scan):  LV wall focal uptake with SUV 3.3. [de-identified] : [facility] 1/27/25: Nonobstructive CAD, LAD 20%. [facility] 4/29/2024: Nonobstructive CAD.  BLESSING-3 flow all vessels.  LVEDP 23. [de-identified] : Endomyocardial biopsy 7/2/2024:  Myocardium with increased lipofuscin pigment.  No inflammation, granulomas or vasculitis is present.

## 2025-04-02 NOTE — PHYSICAL EXAM
[Normal] : soft, non-tender, no masses/organomegaly, normal bowel sounds [Normal Gait] : normal gait [No Edema] : no edema [No Rash] : no rash [Moves all extremities] : moves all extremities [Alert and Oriented] : alert and oriented [de-identified] : no pain on palpation over chest [de-identified] : warm

## 2025-04-02 NOTE — DISCUSSION/SUMMARY
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  Echo with LVEF 50% with some inferior WMA but no obstructive CAD on cath X2.  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  Now with residual scar in inferolateral territory based on numerous studies. Her recent CP episode in Jan 2025 may also have been from a spasm event. PRIOR STUDIES/TX: - MRI on 5/24/24 was concerning for possible subacute myocarditis (but no pericarditis).   - She completed 1.5 months of colchicine.  But had to stop due to diarrhea/GI intolerance. - Subsequent MRI on 6/27/2024 showed interval progression of myocarditis.  - Endomyocardial heart biopsy (7/2024) without active inflammation, vasculitis or granulomas.  -We had explored potential cardiac sarcoid.  However, patient had a PET scan (7/14/24) that was a full body scan, not a dedicated cardiac PET which showed focal increased uptake in the LV lateral wall. This did not match the entire areas of LGE uptake on MRI. Low suspicion for cardiac sarcoid at this time but can consider dedicated cardiac PET at a future date. - Rheumatology workup:  Work up so far negative: ESR, CRP, JOVANI, dsDNA, C3, C4, JARRELL, SSA/SSB, vitamin D 1,25, ACE, MPO, PR3.  Mildly positive RF to 19.  Positive Coxsackie antibodies. -[facility] 1/27/25: 20% LAD stenosis. - cMRI and rest/stress PET done in Feb/March 2025 show residual scar burden of 16.2% in the inferolateral wall with some hypokinesis.  No ischemia.  MFR > 2 in all territories with suggestive of no evidence of classic microvascular dysfunction.  However, stress blood flow did drop some but not enough to drop MFR.  May benefit from targeted microvascular therapy.  See below.  # HFrEF - EF ~48% on cMRI in Feb 2025.     - GDMT: - Continue on metoprolol 50 mg XL daily and Jardiance 10mg daily. - Did not tolerate losartan or Imdur at any dose due to headache. - START on spironolactone 25mg daily.  Risks of breast tenderness and hyperkalemia discused.   - DIURETICS: Euvolemic on exam.  Does not need any diuretics at this time.   - LABS: - 3/31/25: K 4.5, Cr 0.66, normal liver enzymes, MG 2.3, pro . - will obtain labs 2 weeks after starting spironolactone.    - DEVICE: none in place.  - EDUCATION: HF education provided including lifestyle changes (low Na diet, fluid restriction, increase physical activity), current clinical condition, natural progression and prognosis.   #Hyperlipidemia -Patient has intolerance to statins (muscle cramps with atorvastatin 40 mg daily) and Zetia (muscle pain).  - She was previously offered Repatha however she notes that this is too expensive. - She does not want to start bempedoic acid due to potential tendon effects. -She has self-started on fish oil supplements.   - May do well with a PSK9i (have not yet discussed) or Leqvio. -1/17/2025: TG 58, , HDL 59, . -6/29/2024: T TG 63, , HDL 60, . - 1/17/25: , TG 58, HDL 59, . - obtaining repeat lipid panel.  She wants to re-disuss lipid lowering options.  Follow-up in 1 month.

## 2025-05-09 NOTE — HISTORY OF PRESENT ILLNESS
[FreeTextEntry1] : TARA FLORES  is a 70 year old  F He EKG demonstrates sinus rhythm possible infarct we discussed nonstatin lipid-lowering therapy.  She is tangential with her medical history.  She is feeling better.  She is playing golf.  There is a prior history of myocarditis with scarring.  There has been minor weight loss.  She is going to revisit rheumatology regarding dry eye and joint pain.  Eye doctor told of possible Sjogren's.  There is also osteoporosis.  Avoid steroids.  Alcohol abstinence.  Nonstatin lipid-lowering therapy.  Discussed PCSK9 inhibition with monoclonal antibody or RNA interference.  Prior authorization is pending.  Reviewed long-term LDL goal.  Adjunctive dietary measures.  Follow-up blood work.  Will return to office for initial administration Last seen by heart failure specialist in April presently on beta-blocker and SGLT2 inhibitor.  Intolerant of ARB and nitrate.  Most recently MRA was started.   Blood work May 2025 total cholesterol 186  creatinine 0.6 potassium 4.4   recent labs reviewed.   Outside testing reviewed.   Clinical follow-up with Dr. MARROQUIN as scheduled Last visit with me was in February 2025 there has been subsequent visits with Dr. MARROQUIN  there has been plan for repeat MRI and PET scan also guideline directed medical therapy as well as discussion of injectable lipid-lowering therapy  MRI describes mild LV dysfunction with segmental wall motion abnormalities   outside PET scan describes scarring of apex apical inferior apical lateral and mid to basal inferolateral walls there was normal EF  the wall motion abnormalities on MRI were also mid to apical lateral and inferolateral wall  she was intolerant of losartan t intolerance to colchicine tarted on Imdur for possible microvascular disease  blood work more April 2025 creatinine 0.7   there was headaches associated with Imdur and she was intolerant even at low-dose  There is a longstanding history of hypertension. At one point she had lost weight and subsequently had an episode of syncope related to her diuretic. There is a history of hyperlipidemia. She previously had an intolerance to statin.  awoke on Monday 4/29/24 awoke with extreme L arm pain and jaw tightness, called EMS and in ambulance developed chest pain. She ruled in for MI, elevated trops and EKG changes. Went for [facility] showing mild to mod nonobx CAD. Echo showed EF 50% and RWMAs  Returned to [facility] 5/12/24 with recurrent CP radiating to her jaw. Troponin 32. .  Echo showed pericardial thickening.  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 on colchicine and ibuprofen   he has muscle cramps on atorva 40mg. Previously intolerant rosuvastatin.  Followed with Dr. MARROQUIN  intolerant of colchicine also did not take the recommended monoclonal antibody cardiac MRI June 2024 there was report of interval progression and subacute myocarditis and LV function blood work June 2024  ESR normal lipid profile with total cholesterol 205  C-reactive protein normal potassium 4.3 creatinine 0.7 sent by Dr. MARROQUIN to [place] for consideration of heart biopsy and possible immunotherapy admitted June 29 to July 2 third 2024 underwent RV biopsy there was not recommendation of steroids or further immunosuppression at this time. The biopsy reportedly showed no inflammation or infiltrative process  Hospital records.  presented to the hospital with chest discomfort.  Previously had been scheduled for outpatient cath  troponins were negative  cardiac cath January 2025 no obstructive coronary disease LAD mild atherosclerosis 20% stenosis luminal irregularities   Lab 1/2025 hg 14,  (prior 578), tsh wnl, , k 5, cr 0.65, ck/crp/esr/rose wnl, a1c 5.3, Lp(a) wnl Echo 1/2025 EF 55-60% mild LVH, GLS -18, small RV cavity increase wall thickness normal function, trace VHD Nuclear stress test 1/2025 exercise 6 min 39 sec, no symptom, no sig EKG changes beyond baseline. Large moderate to severe fixed defect lateral inferolateral and apical anteroseptal walls s/o infarct. Carotid doppler 3/2024 mild nonobx atherosclerosis  Left heart cath with nonobstructive coronary disease. Prior biopsy without active inflammation.  Consideration of catheterization with microvascular testing?  Guideline directed medical therapy with beta-blocker  Abnormal nuclear stress test.  myocarditis f/u with HF specialist.  Follow-up with heart failure, rheumatology  CAD nonobx lipid lowering intol statins x2 (LFTs, myalgias) declines injectable lipid lowering denies gout hx  HTN off norvasc for low BP low salt diet reg exercise and weight loss reviewed

## 2025-05-12 NOTE — CARDIOLOGY SUMMARY
[de-identified] : PET (rest and stress) 3/11/2025: Myocardial scar burden of 16.2%. No transient ischemic dilation. Myocardial flow reserve> 2 in all areas, moderate degree of non-transmural myocardial scarring in the apex, apical inferior, apical lateral and mid basal inferolateral walls.  LVEF 58%. Nuclear SPECT 1/16/2024: 6: 39 min, 8 METS, MPHR 88%, no chest pain or symptoms during exercise.  Had a large sized moderate to severe fixed defect in the lateral inferolateral and apical septal walls. 4/29/2024: LVEF 50%, LVEDD 5 cm, IVSd 0.8 cm, hypokinetic inferolateral walls, normal RV function, mild LAE, mild to mod MR, no pericardial effusion. [de-identified] : 1/14/2025: LVEF 55 to 60%, LVEDD 4.4 cm, IVSd 1.2 cm, GLS -18%, normal RV TAPSE 2.7 cm, trace MR, trace TR, PASP 9 mmHg, normal IVC. [de-identified] : [facility] 2/26/2025: LVEF 48%, LVEDV 142 mL, no resting perfusion defect, transmural LGE of the mid inferolateral wall and apical lateral wall. [facility] 6/27/24: LVEF 47%, LVEDV 146mL, RVEF 62%, RVEDV 84 mL, increase in subepicardial, mid wall enhancement of the basal anterolateral, lateral and apical lateral segments. [facility] 5/20/24: LVEF 53%, LVEDD 123 mL, RVEF 67%, RV EDV 93 mL, base mid subepicardial enhancement in the lateral and inferolateral walls -suspect subacute myocarditis. [de-identified] : PET scan 7/15/24 (not a cardiac dedicated PET scan):  LV wall focal uptake with SUV 3.3. [de-identified] : [facility] 1/27/25: Nonobstructive CAD, LAD 20%. [facility] 4/29/2024: Nonobstructive CAD.  BLESSING-3 flow all vessels.  LVEDP 23. [de-identified] : Endomyocardial biopsy 7/2/2024:  Myocardium with increased lipofuscin pigment.  No inflammation, granulomas or vasculitis is present.

## 2025-05-12 NOTE — PHYSICAL EXAM
[Normal] : soft, non-tender, no masses/organomegaly, normal bowel sounds [Normal Gait] : normal gait [No Edema] : no edema [No Rash] : no rash [Moves all extremities] : moves all extremities [Alert and Oriented] : alert and oriented [de-identified] : no pain on palpation over chest [de-identified] : warm

## 2025-05-12 NOTE — DISCUSSION/SUMMARY
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  Echo with LVEF 50% with some inferior WMA but no obstructive CAD on cath X2.  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  Now with residual scar in inferolateral territory based on numerous studies. Her recent CP episode in Jan 2025 may also have been from a spasm event. PRIOR STUDIES/TX: - MRI on 5/24/24 was concerning for possible subacute myocarditis (but no pericarditis).   - She completed 1.5 months of colchicine.  But had to stop due to diarrhea/GI intolerance. - Subsequent MRI on 6/27/2024 showed interval progression of myocarditis.  - Endomyocardial heart biopsy (7/2024) without active inflammation, vasculitis or granulomas.  -We had explored potential cardiac sarcoid.  However, patient had a PET scan (7/14/24) that was a full body scan, not a dedicated cardiac PET which showed focal increased uptake in the LV lateral wall. This did not match the entire areas of LGE uptake on MRI. Low suspicion for cardiac sarcoid at this time but can consider dedicated cardiac PET at a future date. - Rheumatology workup:  Work up so far negative: ESR, CRP, JOVANI, dsDNA, C3, C4, JARRELL, SSA/SSB, vitamin D 1,25, ACE, MPO, PR3.  Mildly positive RF to 19.  Positive Coxsackie antibodies. -C 1/27/25: 20% LAD stenosis. - cMRI and rest/stress PET done in Feb/March 2025 show residual scar burden of 16.2% in the inferolateral wall with some hypokinesis.  No ischemia.  MFR > 2 in all territories with suggestive of no evidence of classic microvascular dysfunction.  However, stress blood flow did drop some but not enough to drop MFR.  May benefit from targeted microvascular therapy.  See below.  # HFrEF - EF ~48% on cMRI in Feb 2025.   - will obtain repeat echo.   - GDMT: - Continue on metoprolol 50 mg XL daily, spironolactone 25mg daily, and Jardiance 10mg daily. - Did not tolerate losartan or Imdur at any dose due to headache. - during periods of "crashing" I have asked her to keep track of her BP.  I also asked her to bring in her BP cuff next visit so we can verify against manual BP check.   - DIURETICS: Euvolemic on exam.  Does not need any diuretics at this time.   - LABS: - 3/31/25: K 4.5, Cr 0.66, normal liver enzymes, MG 2.3, pro . - 5/7/2025: K4.4, creatinine 0.64, slight AST elevation up to 37 from 35, proBNP 420, MG 2.3.   - DEVICE: none in place.  - EDUCATION: HF education provided including lifestyle changes (low Na diet, fluid restriction, increase physical activity), current clinical condition, natural progression and prognosis.   #Hyperlipidemia -Patient has intolerance to statins (muscle cramps with atorvastatin 40 mg daily) and Zetia (muscle pain). -  She was previously offered Repatha however she notes that this is too expensive. - She does not want to start bempedoic acid due to potential tendon effects. -She has self-started on fish oil supplements.   - May do well with a PSK9i (have not yet discussed) or Leqvio. -1/17/2025: TG 58, , HDL 59, . -6/29/2024: T TG 63, , HDL 60, . - 1/17/25: , TG 58, HDL 59, . - 5/7/2025: , TG 59, HDL 59, . - She was seen by Dr. Her and is now pending authorization for Praluent.  Follow-up post echo.

## 2025-06-18 NOTE — HISTORY OF PRESENT ILLNESS
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  Patient describes herself as having longstanding hypertension for> 10 years.  She was evaluated the beginning of March by her primary cardiologist for heart palpitations.  This occurred shortly after she had started a new weight loss program called [employer].  She had lost 11 pounds over the course of 3 weeks.  She was exercising for two 45-minute intervals per day.  She established care with Dr. Her.  Holter monitor, echo and nuclear test performed at that time and was started on low-dose metoprolol.  She had no evidence of any ischemia or pericardial effusion.  The day after her follow-up visit she presented to [facility] on 4/29/2024 with jaw pain found to have STEMI.  Echo with LVEF 50% with some inferior WMA. Patient had elevated troponins and underwent LHC showing mild to moderate nonobstructive CAD.  She was discovered with consideration for possible SCAD/FMD/ myocarditis.  Patient continued to have recurrent chest pain and was readmitted on 5/3/2024 and 5/12/24.  Troponin once again was waxing and waning.  Colchicine was added for possible pericarditis.  She had intermittent intolerance to colchicine.  She had 2 other subsequent ED visits with similar chest pain radiating to her jaw.  ESR at this time was 6.  Outpatient MRI was performed on 5/20/24 which showed LVEF 53% with base mid subepicardial enhancement in the lateral and inferolateral walls -suspicious for subacute myocarditis.    First met her on 6/21/2024.  At that time she colchicine earlier due to feeling poorly.  She has been on some form of colchicine since 5/3/2024 - 6/14/24 (~ 1 month).  She ongoing chest discomfort.  I had offer her a myocardial biopsy as a diagnostic tool however patient deferred.  Instead, we proceeded with repeat cardiac MRI which was done on 6/27/2024 which reported interval progression of subacute myocarditis with decrease in LV function from 53 to 47%.  She was admitted to [facility] for cardiac biopsy and expedited evaluation.  Luminary biopsy results show no inflammation or infiltrative process.  No vasculitis or gamulomas. There was an increase lipofuscin pigmented myocardial cells are present. Laboratory markers were low with ESR < 2 and CRP <3.  No steroids or immunomodulators were initiated.  She had seen me in July 2024 but not again until Jan 2025.  At that time, she represented to her primary cardiologist in January 2025 with chest discomfort. Nuclear SPECT on 1/16/2024 was done and showed a large sized moderate to severe fixed defect in the lateral inferolateral and apical septal walls.  Echocardiogram on 1/10/2025 showed normal LVEF of 55-60% with no effusion.  Patient presented to [facility] with chest pain and was admitted from 1/25-1/27/25.  Troponin fluctuated between 8-10.  CK 76, CRP <0.4, ESR 4.   EKG without ischemic changes.  She underwent left heart catheterization which showed only 20% LAD stenosis.  She notes her metoprolol was increased to 50 mg twice daily.  She self-discontinued prior losartan in July 2024 and re-trialed her on this in Jan 2025 but did not tolerate this due to headache and dizziness side effects.  Repeat cardiac PET and cMRI were done in Feb/March 2025 show residual scar burden of 16.2% in the inferolateral wall with some hypokinesis.  No ischemia.  MFR > 2 in all territories with suggestive of no evidence of classic microvascular dysfunction.    I last saw her on 3/13/25 and had started her on Imdur.  She did not tolerate this due to headache and chest pain. Instead she started on Jardiance 10mg daily.  On 4/2/25 she was started on spironolactone 25 mg daily.  I last saw her on 5/12/2024.  Repeat echo was ordered.  She is here today to review the results.  Today, she reports GI upset that she thinks is from the Jardiance.  She is having irregular bowel movements.  She told me she had a recent EGD and colonoscopy with her GI doctor and did require esophageal dilation.  She golfs twice per week doing 18 holes but uses a golf cart between holes.  She feels quite fatigued after this. Otherwise, she denies dyspnea at rest or exertion, orthopnea, leg swelling, changes in appetite, changes in weight, bendopnea, lightheadedness, dizziness, and syncope/pre-syncope.  She does note periods of "crashing" at home.  She has not kept a log of what happens during these times.  She does endorse dry eye and occasional dysphagia with GERD and hiatal hernia. She does not have a diagnosis of arthritis or any autoimmune conditions that her rheumatologist was able to diagnose.  However, she was on prednisone before [place].  She now has osteoporosis.  She is supposed to start on Prolia but is yet to do so as she is waiting for dental clearance.

## 2025-06-18 NOTE — ASSESSMENT
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  She has ACC/AHA stage C cardiomyopathy, with NYHA class I-II symptoms.

## 2025-06-18 NOTE — PHYSICAL EXAM
[Normal] : soft, non-tender, no masses/organomegaly, normal bowel sounds [Normal Gait] : normal gait [No Edema] : no edema [No Rash] : no rash [Moves all extremities] : moves all extremities [Alert and Oriented] : alert and oriented [de-identified] : no pain on palpation over chest [de-identified] : warm

## 2025-06-18 NOTE — DISCUSSION/SUMMARY
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  Echo with LVEF 50% with some inferior WMA but no obstructive CAD on cath X2.  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  Now with residual scar in inferolateral territory based on numerous studies. Her recent CP episode in Jan 2025 may also have been from a spasm event. PRIOR STUDIES/TX: - MRI on 5/24/24 was concerning for possible subacute myocarditis (but no pericarditis).   - She completed 1.5 months of colchicine.  But had to stop due to diarrhea/GI intolerance. - Subsequent MRI on 6/27/2024 showed interval progression of myocarditis.  - Endomyocardial heart biopsy (7/2024) without active inflammation, vasculitis or granulomas.  -We had explored potential cardiac sarcoid.  However, patient had a PET scan (7/14/24) that was a full body scan, not a dedicated cardiac PET which showed focal increased uptake in the LV lateral wall. This did not match the entire areas of LGE uptake on MRI. Low suspicion for cardiac sarcoid at this time but can consider dedicated cardiac PET at a future date. - Rheumatology workup:  Work up so far negative: ESR, CRP, JOVANI, dsDNA, C3, C4, JARRELL, SSA/SSB, vitamin D 1,25, ACE, MPO, PR3.  Mildly positive RF to 19.  Positive Coxsackie antibodies. -[facility] 1/27/25: 20% LAD stenosis. - cMRI and rest/stress PET done in Feb/March 2025 show residual scar burden of 16.2% in the inferolateral wall with some hypokinesis.  No ischemia.  MFR > 2 in all territories with suggestive of no evidence of classic microvascular dysfunction.  However, stress blood flow did drop some but not enough to drop MFR.  May benefit from targeted microvascular therapy.  See below.  # HFrEF - EF ~48% on cMRI in Feb 2025. Echo from 6/16/25 with increased LVEF to 56%.    - GDMT: - Continue on metoprolol 50 mg XL daily, spironolactone 25mg daily, and Jardiance 10mg daily. - With her GI symptoms, I have asked her to do a trial of Jardiance 5 mg daily and see if she tolerates this better. - Did not tolerate losartan or Imdur at any dose due to headache.   - DIURETICS: Euvolemic on exam.  Does not need any diuretics at this time.   - LABS: - 3/31/25: K 4.5, Cr 0.66, normal liver enzymes, MG 2.3, pro . - 5/7/2025: K4.4, creatinine 0.64, slight AST elevation up to 37 from 35, proBNP 420, MG 2.3.   - DEVICE: none in place.  - EDUCATION: HF education provided including lifestyle changes (low Na diet, fluid restriction, increase physical activity), current clinical condition, natural progression and prognosis.   #Hyperlipidemia -Patient has intolerance to statins (muscle cramps with atorvastatin 40 mg daily) and Zetia (muscle pain). -  She was previously offered Repatha however she notes that this is too expensive. - She does not want to start bempedoic acid due to potential tendon effects. -She has self-started on fish oil supplements.   - She was seen by Dr. Her and is now on Praluent - continue. -1/17/2025: TG 58, , HDL 59, . -6/29/2024: T TG 63, , HDL 60, . - 1/17/25: , TG 58, HDL 59, . - 5/7/2025: , TG 59, HDL 59,  (pre praluent).   Follow-up in 3 months.

## 2025-06-18 NOTE — CARDIOLOGY SUMMARY
[de-identified] : PET (rest and stress) 3/11/2025: Myocardial scar burden of 16.2%. No transient ischemic dilation. Myocardial flow reserve> 2 in all areas, moderate degree of non-transmural myocardial scarring in the apex, apical inferior, apical lateral and mid basal inferolateral walls.  LVEF 58%. Nuclear SPECT 1/16/2024: 6: 39 min, 8 METS, MPHR 88%, no chest pain or symptoms during exercise.  Had a large sized moderate to severe fixed defect in the lateral inferolateral and apical septal walls. 4/29/2024: LVEF 50%, LVEDD 5 cm, IVSd 0.8 cm, hypokinetic inferolateral walls, normal RV function, mild LAE, mild to mod MR, no pericardial effusion. [de-identified] : 6/16/25: LVEF 55%, LVEDD 5.1 cm, IVSd 1.0 cm, GLS -15.4%, hypokinesis of basal inferolateral wall, mild MR, trace TR, PASP 21 mmHg, normal IVC. 1/14/2025: LVEF 55 to 60%, LVEDD 4.4 cm, IVSd 1.2 cm, GLS -18%, normal RV TAPSE 2.7 cm, trace MR, trace TR, PASP 9 mmHg, normal IVC. [de-identified] : [facility] 2/26/2025: LVEF 48%, LVEDV 142 mL, no resting perfusion defect, transmural LGE of the mid inferolateral wall and apical lateral wall. [facility] 6/27/24: LVEF 47%, LVEDV 146mL, RVEF 62%, RVEDV 84 mL, increase in subepicardial, mid wall enhancement of the basal anterolateral, lateral and apical lateral segments. [facility] 5/20/24: LVEF 53%, LVEDD 123 mL, RVEF 67%, RV EDV 93 mL, base mid subepicardial enhancement in the lateral and inferolateral walls -suspect subacute myocarditis. [de-identified] : PET scan 7/15/24 (not a cardiac dedicated PET scan):  LV wall focal uptake with SUV 3.3. [de-identified] : [facility] 1/27/25: Nonobstructive CAD, LAD 20%. [facility] 4/29/2024: Nonobstructive CAD.  BLESSING-3 flow all vessels.  LVEDP 23. [de-identified] : Endomyocardial biopsy 7/2/2024:  Myocardium with increased lipofuscin pigment.  No inflammation, granulomas or vasculitis is present.